# Patient Record
Sex: MALE | Race: WHITE | HISPANIC OR LATINO | Employment: OTHER | URBAN - METROPOLITAN AREA
[De-identification: names, ages, dates, MRNs, and addresses within clinical notes are randomized per-mention and may not be internally consistent; named-entity substitution may affect disease eponyms.]

---

## 2023-02-03 PROBLEM — M11.279: Status: ACTIVE | Noted: 2023-02-03

## 2023-02-03 PROBLEM — M77.01 MEDIAL EPICONDYLITIS OF RIGHT ELBOW: Status: ACTIVE | Noted: 2023-02-03

## 2023-02-03 PROBLEM — E16.2 HYPOGLYCEMIA: Status: ACTIVE | Noted: 2023-02-03

## 2023-02-03 PROBLEM — L30.1 ECZEMA, DYSHIDROTIC: Status: ACTIVE | Noted: 2023-02-03

## 2023-02-03 PROBLEM — R30.0 DYSURIA: Status: ACTIVE | Noted: 2023-02-03

## 2023-02-03 PROBLEM — I25.10 ATHEROSCLEROSIS OF NATIVE CORONARY ARTERY WITHOUT ANGINA PECTORIS: Status: ACTIVE | Noted: 2023-02-03

## 2023-02-03 PROBLEM — M94.0 COSTOCHONDRITIS, ACUTE: Status: ACTIVE | Noted: 2023-02-03

## 2023-02-03 PROBLEM — R35.0 URINARY FREQUENCY: Status: ACTIVE | Noted: 2023-02-03

## 2023-02-03 PROBLEM — M19.041 ARTHRITIS OF BOTH HANDS: Status: ACTIVE | Noted: 2023-02-03

## 2023-02-03 PROBLEM — T14.8XXA SIMPLE BRUISING: Status: ACTIVE | Noted: 2023-02-03

## 2023-02-03 PROBLEM — F41.1 ANXIETY, GENERALIZED: Status: ACTIVE | Noted: 2023-02-03

## 2023-02-03 PROBLEM — G45.4 TRANSIENT GLOBAL AMNESIA: Status: ACTIVE | Noted: 2023-02-03

## 2023-02-03 PROBLEM — J30.2 SEASONAL ALLERGIC RHINITIS: Status: ACTIVE | Noted: 2023-02-03

## 2023-02-03 PROBLEM — T78.40XA ALLERGY: Status: ACTIVE | Noted: 2023-02-03

## 2023-02-03 PROBLEM — K21.9 GERD (GASTROESOPHAGEAL REFLUX DISEASE): Status: ACTIVE | Noted: 2023-02-03

## 2023-02-03 PROBLEM — J30.9 ALLERGIC RHINITIS: Status: ACTIVE | Noted: 2023-02-03

## 2023-02-03 PROBLEM — J39.2 THROAT IRRITATION: Status: ACTIVE | Noted: 2023-02-03

## 2023-02-03 PROBLEM — M65.30 TRIGGER FINGER, ACQUIRED: Status: ACTIVE | Noted: 2023-02-03

## 2023-02-03 PROBLEM — M77.10 TENNIS ELBOW SYNDROME: Status: ACTIVE | Noted: 2023-02-03

## 2023-02-03 PROBLEM — R42 VERTIGO: Status: ACTIVE | Noted: 2023-02-03

## 2023-02-03 PROBLEM — M19.042 ARTHRITIS OF BOTH HANDS: Status: ACTIVE | Noted: 2023-02-03

## 2023-02-03 PROBLEM — R23.2 SKIN BLUSHING/FLUSHING: Status: ACTIVE | Noted: 2023-02-03

## 2023-02-03 PROBLEM — R07.89 CHEST PAIN, ATYPICAL: Status: ACTIVE | Noted: 2023-02-03

## 2023-02-03 PROBLEM — M26.69 TMJ DERANGEMENT: Status: ACTIVE | Noted: 2023-02-03

## 2023-02-03 PROBLEM — I10 HYPERTENSION: Status: ACTIVE | Noted: 2023-02-03

## 2023-02-03 PROBLEM — R00.2 PALPITATIONS: Status: ACTIVE | Noted: 2023-02-03

## 2023-02-03 PROBLEM — N36.8 URETHRAL IRRITATION: Status: ACTIVE | Noted: 2023-02-03

## 2023-02-03 RX ORDER — CYCLOBENZAPRINE HCL 10 MG
1 TABLET ORAL NIGHTLY PRN
COMMUNITY
Start: 2016-05-11 | End: 2023-03-20 | Stop reason: SDUPTHER

## 2023-02-03 RX ORDER — ALBUTEROL SULFATE 90 UG/1
2 AEROSOL, METERED RESPIRATORY (INHALATION) EVERY 6 HOURS PRN
COMMUNITY
Start: 2014-03-06

## 2023-02-03 RX ORDER — ATORVASTATIN CALCIUM 40 MG/1
1.5 TABLET, FILM COATED ORAL DAILY
COMMUNITY
Start: 2016-05-11

## 2023-02-03 RX ORDER — NEBIVOLOL 5 MG/1
3 TABLET ORAL DAILY
COMMUNITY
Start: 2013-07-02

## 2023-02-03 RX ORDER — MUPIROCIN 20 MG/G
OINTMENT TOPICAL SEE ADMIN INSTRUCTIONS
COMMUNITY
Start: 2021-10-12 | End: 2023-05-24 | Stop reason: ALTCHOICE

## 2023-02-03 RX ORDER — EZETIMIBE 10 MG/1
1 TABLET ORAL NIGHTLY
COMMUNITY
Start: 2014-03-04

## 2023-02-03 RX ORDER — CLOTRIMAZOLE AND BETAMETHASONE DIPROPIONATE 10; .64 MG/G; MG/G
CREAM TOPICAL SEE ADMIN INSTRUCTIONS
COMMUNITY
Start: 2021-10-25 | End: 2023-05-24 | Stop reason: ALTCHOICE

## 2023-03-14 LAB
ALANINE AMINOTRANSFERASE (SGPT) (U/L) IN SER/PLAS: 27 U/L (ref 10–52)
ALBUMIN (G/DL) IN SER/PLAS: 4 G/DL (ref 3.4–5)
ALKALINE PHOSPHATASE (U/L) IN SER/PLAS: 93 U/L (ref 33–136)
ANION GAP IN SER/PLAS: 8 MMOL/L (ref 10–20)
ASPARTATE AMINOTRANSFERASE (SGOT) (U/L) IN SER/PLAS: 21 U/L (ref 9–39)
BASOPHILS (10*3/UL) IN BLOOD BY AUTOMATED COUNT: 0.09 X10E9/L (ref 0–0.1)
BASOPHILS/100 LEUKOCYTES IN BLOOD BY AUTOMATED COUNT: 1.2 % (ref 0–2)
BILIRUBIN TOTAL (MG/DL) IN SER/PLAS: 1.1 MG/DL (ref 0–1.2)
CALCIUM (MG/DL) IN SER/PLAS: 9.4 MG/DL (ref 8.6–10.3)
CARBON DIOXIDE, TOTAL (MMOL/L) IN SER/PLAS: 32 MMOL/L (ref 21–32)
CHLORIDE (MMOL/L) IN SER/PLAS: 102 MMOL/L (ref 98–107)
CREATININE (MG/DL) IN SER/PLAS: 0.75 MG/DL (ref 0.5–1.3)
EOSINOPHILS (10*3/UL) IN BLOOD BY AUTOMATED COUNT: 0.24 X10E9/L (ref 0–0.4)
EOSINOPHILS/100 LEUKOCYTES IN BLOOD BY AUTOMATED COUNT: 3.1 % (ref 0–6)
ERYTHROCYTE DISTRIBUTION WIDTH (RATIO) BY AUTOMATED COUNT: 13.3 % (ref 11.5–14.5)
ERYTHROCYTE MEAN CORPUSCULAR HEMOGLOBIN CONCENTRATION (G/DL) BY AUTOMATED: 32.4 G/DL (ref 32–36)
ERYTHROCYTE MEAN CORPUSCULAR VOLUME (FL) BY AUTOMATED COUNT: 95 FL (ref 80–100)
ERYTHROCYTES (10*6/UL) IN BLOOD BY AUTOMATED COUNT: 5.09 X10E12/L (ref 4.5–5.9)
ESTIMATED AVERAGE GLUCOSE FOR HBA1C: 126 MG/DL
GFR MALE: >90 ML/MIN/1.73M2
GLUCOSE (MG/DL) IN SER/PLAS: 109 MG/DL (ref 74–99)
HEMATOCRIT (%) IN BLOOD BY AUTOMATED COUNT: 48.5 % (ref 41–52)
HEMOGLOBIN (G/DL) IN BLOOD: 15.7 G/DL (ref 13.5–17.5)
HEMOGLOBIN A1C/HEMOGLOBIN TOTAL IN BLOOD: 6 %
IMMATURE GRANULOCYTES/100 LEUKOCYTES IN BLOOD BY AUTOMATED COUNT: 0.4 % (ref 0–0.9)
LEUKOCYTES (10*3/UL) IN BLOOD BY AUTOMATED COUNT: 7.7 X10E9/L (ref 4.4–11.3)
LYMPHOCYTES (10*3/UL) IN BLOOD BY AUTOMATED COUNT: 1.28 X10E9/L (ref 0.8–3)
LYMPHOCYTES/100 LEUKOCYTES IN BLOOD BY AUTOMATED COUNT: 16.7 % (ref 13–44)
MONOCYTES (10*3/UL) IN BLOOD BY AUTOMATED COUNT: 0.62 X10E9/L (ref 0.05–0.8)
MONOCYTES/100 LEUKOCYTES IN BLOOD BY AUTOMATED COUNT: 8.1 % (ref 2–10)
NEUTROPHILS (10*3/UL) IN BLOOD BY AUTOMATED COUNT: 5.42 X10E9/L (ref 1.6–5.5)
NEUTROPHILS/100 LEUKOCYTES IN BLOOD BY AUTOMATED COUNT: 70.5 % (ref 40–80)
PLATELETS (10*3/UL) IN BLOOD AUTOMATED COUNT: 280 X10E9/L (ref 150–450)
POTASSIUM (MMOL/L) IN SER/PLAS: 4.8 MMOL/L (ref 3.5–5.3)
PROTEIN TOTAL: 6.7 G/DL (ref 6.4–8.2)
SODIUM (MMOL/L) IN SER/PLAS: 137 MMOL/L (ref 136–145)
THYROTROPIN (MIU/L) IN SER/PLAS BY DETECTION LIMIT <= 0.05 MIU/L: 0.99 MIU/L (ref 0.44–3.98)
UREA NITROGEN (MG/DL) IN SER/PLAS: 15 MG/DL (ref 6–23)

## 2023-03-17 LAB
PROSTATE SPECIFIC AG (NG/ML) IN SER/PLAS: 1.7 NG/ML (ref 0–4)
PROSTATE SPECIFIC AG FREE (NG/ML) IN SER/PLAS: 0.4 NG/ML
PROSTATE SPECIFIC AG FREE/PROSTATE SPECIFIC AG TOTAL IN SER/PLAS: 24 %

## 2023-03-20 ENCOUNTER — TELEPHONE (OUTPATIENT)
Dept: PRIMARY CARE | Facility: CLINIC | Age: 78
End: 2023-03-20

## 2023-03-20 ENCOUNTER — OFFICE VISIT (OUTPATIENT)
Dept: PRIMARY CARE | Facility: CLINIC | Age: 78
End: 2023-03-20
Payer: COMMERCIAL

## 2023-03-20 VITALS
SYSTOLIC BLOOD PRESSURE: 124 MMHG | BODY MASS INDEX: 28.16 KG/M2 | WEIGHT: 179.4 LBS | TEMPERATURE: 97.3 F | DIASTOLIC BLOOD PRESSURE: 80 MMHG | HEIGHT: 67 IN

## 2023-03-20 DIAGNOSIS — K21.9 GASTROESOPHAGEAL REFLUX DISEASE WITHOUT ESOPHAGITIS: ICD-10-CM

## 2023-03-20 DIAGNOSIS — M19.041 ARTHRITIS OF BOTH HANDS: ICD-10-CM

## 2023-03-20 DIAGNOSIS — I25.10 ATHEROSCLEROSIS OF NATIVE CORONARY ARTERY OF NATIVE HEART WITHOUT ANGINA PECTORIS: ICD-10-CM

## 2023-03-20 DIAGNOSIS — E16.2 HYPOGLYCEMIA: ICD-10-CM

## 2023-03-20 DIAGNOSIS — R35.0 URINARY FREQUENCY: ICD-10-CM

## 2023-03-20 DIAGNOSIS — F41.1 ANXIETY, GENERALIZED: ICD-10-CM

## 2023-03-20 DIAGNOSIS — E66.9 DIABETES MELLITUS TYPE 2 IN OBESE: Primary | ICD-10-CM

## 2023-03-20 DIAGNOSIS — E11.69 DIABETES MELLITUS TYPE 2 IN OBESE: Primary | ICD-10-CM

## 2023-03-20 DIAGNOSIS — M11.279: ICD-10-CM

## 2023-03-20 DIAGNOSIS — M19.042 ARTHRITIS OF BOTH HANDS: ICD-10-CM

## 2023-03-20 DIAGNOSIS — M62.830 MUSCLE SPASM OF BACK: ICD-10-CM

## 2023-03-20 PROCEDURE — 1036F TOBACCO NON-USER: CPT | Performed by: FAMILY MEDICINE

## 2023-03-20 PROCEDURE — 3074F SYST BP LT 130 MM HG: CPT | Performed by: FAMILY MEDICINE

## 2023-03-20 PROCEDURE — 99214 OFFICE O/P EST MOD 30 MIN: CPT | Performed by: FAMILY MEDICINE

## 2023-03-20 PROCEDURE — 1159F MED LIST DOCD IN RCRD: CPT | Performed by: FAMILY MEDICINE

## 2023-03-20 PROCEDURE — 3079F DIAST BP 80-89 MM HG: CPT | Performed by: FAMILY MEDICINE

## 2023-03-20 RX ORDER — CYCLOBENZAPRINE HCL 10 MG
10 TABLET ORAL NIGHTLY PRN
Qty: 20 TABLET | Refills: 1 | Status: SHIPPED | OUTPATIENT
Start: 2023-03-20 | End: 2024-03-14 | Stop reason: SDUPTHER

## 2023-03-20 RX ORDER — TAMSULOSIN HYDROCHLORIDE 0.4 MG/1
0.4 CAPSULE ORAL DAILY
Qty: 90 CAPSULE | Refills: 1 | Status: SHIPPED | OUTPATIENT
Start: 2023-03-20 | End: 2023-05-24 | Stop reason: ALTCHOICE

## 2023-03-20 ASSESSMENT — ENCOUNTER SYMPTOMS
CONSTITUTIONAL NEGATIVE: 1
CARDIOVASCULAR NEGATIVE: 1
PSYCHIATRIC NEGATIVE: 1
SHORTNESS OF BREATH: 0
HEMATOLOGIC/LYMPHATIC NEGATIVE: 1
COUGH: 0
NEUROLOGICAL NEGATIVE: 1
WHEEZING: 0
EYES NEGATIVE: 1

## 2023-03-20 NOTE — TELEPHONE ENCOUNTER
Pt states he was seen this morning (3/20/2023) and did not get the RX for the Flomax. Would like it sent to [facility] in [place].

## 2023-03-20 NOTE — PROGRESS NOTES
Subjective   Patient ID: Franklin Jason is a 77 y.o. male who presents for refills on medication and to discuss his health maintenance.  HPI  December 28, 2020  75-year-old male is here today for follow-up and to get a hemoglobin A1c. He does see Dr. Peralta a regular basis who is managing his cardiac medications. His last lipid panel was done through [facility] on August 12, 2022 and showed a total cholesterol of 130 HDL 49 a ratio of 2.65 and LDL of 68 and a triglyceride level of 63 . His hemoglobin A1c today was 6.1%. He was somewhat surprised with this number so we discussed ways of getting the number back down below 5.7%. He had an episode of joint inflammation that he thought might be due to gout however his uric acid level was not elevated at the time so the diagnosis was made of pseudogout and a prescription for Medrol Dosepak was phoned in. The needle worked well and I am giving her prescription to have on hand should he experience another attack. I will see him back in 3 months and at that time we will repeat his hemoglobin A1c comprehensive metabolic panel CBC TSH and PSA 04 January 26, 2022  This is 76-year-old male is here today because he has developed some pain primarily involving his left ankle associated with redness and swelling. He had been working in a cramped space really caulking an upstairs bathroom but does not remember any specific injury to his ankle or foot. That evening he noted more swelling and it was painful even to lie in bed and rollover. He has never had an episode of joint swelling like this in the past and there is no family history of gout that he is aware of.  He also has been shoveling snow and has also developed soreness on the inside of his right elbow consistent with medial epicondylitis.  I am having him get a uric acid level drawn and I am printing out information on treatment for gout. We will go on the relatively short steroid course with prednisone 10  mg #14. We discussed that sometimes gout can become recurrent and may require the use of medication preventatively to keep his uric acid levels from becoming too high. In the meantime I recommend that he increase his fluid intake so that he is not bordering on dehydration.     September 22, 2021  This 75-year-old male is here today for several reasons.  1. Groin Rash:  The patient has had a rash on his penis that partially involves the head of the penis but also been extends down underneath the corona. The rash is not particularly pruritic nor is it significantly erythematous at least on the head of the penis. He denies any specific irritation or exposure to new products. He says it does hurt a little more after intercourse but not dramatically so. He has been concerned that he may have some prostate or other issue related to his urinary tract.  The rash that I can see is nonspecific and does not appear to be bacterial or fungal.  2. Left Axillary Pain:  The patient has noted some soreness in his left armpit and was concerned he might have lymph gland swelling. His physical examination was normal and he does not have any signs of lymphadenopathy.  3. Lab Tests:  The patient has not had a PSA level done since 2017 since his brother was recently diagnosed with prostate cancer. We brought his old test to look at them and none of them were above 1.3.  I am sending him downstairs for a PSA along with other pending lab work she has not had for about 1 year.  4. The patient previously has had a lung nodule but has not had it checked for several years. I am writing up a lab slip for him to get a CT scan noncontrast of the chest to follow-up on his pulmonary nodules. He will be getting this test at [facility] or the [facility] as they have the rest of his images to compare.     August 15, 2020  This 74-year-old male is here today ostensibly to get a trigger finger injected but he has several other concerns and  "questions.  1-Dyshidrotic Eczema:  The patient has problems waking up and having difficulty getting back to sleep because of itching. The itching tends to be \"all over\". He does take Benadryl 2 teaspoons at bedtime to help him sleep and also help with the itching. I have recommended that he reduce the amount of soap and warm water he subjects himself to every day when he showers. I have also suggested he start using a skin care, and like Eucerin or Lac-Hydrin.  2-trigger finger:  The patient's middle finger on his right hand is triggering. He has had this happen in the past and he has responded to injections. He also has in the past undergone procedures for releasing trigger fingers through [facility].  3- Dry Mouth:  The patient is complaining of frequently having a \"dry mouth\". He has tried material and products like Biotene without success. I indicated there are prescription medications but they often have additional side effects.  4-pulmonary nodules:  The patient has had pulmonary nodules that were discovered in the course of the CT scan of his chest done at the [facility]. I am giving him a work order to get another noncontrast CT of the chest to follow-up on these pulmonary nodules     Review of Systems   Constitutional: Negative.    HENT: Negative.     Eyes: Negative.    Respiratory:  Negative for cough, shortness of breath and wheezing.    Cardiovascular: Negative.    Genitourinary: Negative.    Neurological: Negative.    Hematological: Negative.    Psychiatric/Behavioral: Negative.     All other systems reviewed and are negative.      Objective   Physical Exam  Vitals and nursing note reviewed.   Constitutional:       Appearance: Normal appearance. He is normal weight.   HENT:      Head: Normocephalic.      Right Ear: Ear canal normal.      Left Ear: Ear canal normal.      Nose: Nose normal.      Mouth/Throat:      Mouth: Mucous membranes are moist.   Eyes:      Pupils: Pupils are equal, round, " and reactive to light.   Neurological:      General: No focal deficit present.      Mental Status: He is alert and oriented to person, place, and time.   Psychiatric:         Mood and Affect: Mood normal.         Behavior: Behavior normal.         Thought Content: Thought content normal.         Judgment: Judgment normal.         Assessment/Plan

## 2023-05-24 ENCOUNTER — OFFICE VISIT (OUTPATIENT)
Dept: PRIMARY CARE | Facility: CLINIC | Age: 78
End: 2023-05-24
Payer: COMMERCIAL

## 2023-05-24 VITALS
WEIGHT: 180 LBS | SYSTOLIC BLOOD PRESSURE: 120 MMHG | DIASTOLIC BLOOD PRESSURE: 76 MMHG | BODY MASS INDEX: 28.25 KG/M2 | TEMPERATURE: 97.7 F | HEIGHT: 67 IN

## 2023-05-24 DIAGNOSIS — R35.0 FREQUENCY OF URINATION: Primary | ICD-10-CM

## 2023-05-24 PROCEDURE — 1159F MED LIST DOCD IN RCRD: CPT | Performed by: FAMILY MEDICINE

## 2023-05-24 PROCEDURE — 3074F SYST BP LT 130 MM HG: CPT | Performed by: FAMILY MEDICINE

## 2023-05-24 PROCEDURE — 3078F DIAST BP <80 MM HG: CPT | Performed by: FAMILY MEDICINE

## 2023-05-24 PROCEDURE — 99397 PER PM REEVAL EST PAT 65+ YR: CPT | Performed by: FAMILY MEDICINE

## 2023-05-24 PROCEDURE — 1036F TOBACCO NON-USER: CPT | Performed by: FAMILY MEDICINE

## 2023-05-24 RX ORDER — DOXAZOSIN 2 MG/1
2 TABLET ORAL NIGHTLY
Qty: 30 TABLET | Refills: 5 | Status: SHIPPED | OUTPATIENT
Start: 2023-05-24 | End: 2023-11-20

## 2023-05-24 ASSESSMENT — ENCOUNTER SYMPTOMS
WEAKNESS: 1
JOINT SWELLING: 1
NECK PAIN: 0
APPETITE CHANGE: 0
PALPITATIONS: 0
BRUISES/BLEEDS EASILY: 0
SPEECH DIFFICULTY: 0
DIARRHEA: 0
VOICE CHANGE: 0
HEADACHES: 0
DIAPHORESIS: 0
NERVOUS/ANXIOUS: 0
CONSTIPATION: 0
VOMITING: 0
PHOTOPHOBIA: 0
CHILLS: 0
ADENOPATHY: 0
EYE DISCHARGE: 0
ANAL BLEEDING: 0
SHORTNESS OF BREATH: 0
EYE REDNESS: 0
DIZZINESS: 0
SLEEP DISTURBANCE: 0
SORE THROAT: 0
FATIGUE: 0
ABDOMINAL PAIN: 0
WHEEZING: 0
CHEST TIGHTNESS: 0
SINUS PRESSURE: 0
CONFUSION: 0
BACK PAIN: 0
COLOR CHANGE: 1
EYE ITCHING: 0
BLOOD IN STOOL: 0
SEIZURES: 0
FREQUENCY: 0
ABDOMINAL DISTENTION: 0
NUMBNESS: 0
DECREASED CONCENTRATION: 0
NAUSEA: 0
ARTHRALGIAS: 0
AGITATION: 0
FEVER: 0
COUGH: 0
TREMORS: 0
EYE PAIN: 0
UNEXPECTED WEIGHT CHANGE: 0
TROUBLE SWALLOWING: 0
NECK STIFFNESS: 0
HEMATURIA: 0

## 2023-05-24 NOTE — PROGRESS NOTES
Subjective   Patient ID: Franklin Jason is a 77 y.o. male who presents for No chief complaint on file..  A depressed scaly red area on his elbow.  HPI         May 24, 2023        This 77-year-old male who is not on Medicare because he is covered through his wife's health plan, is here today complaining of a depressed red scaly area on his elbow.  He has a hardened scab at the tip of his olecranon process that he continues to irritate him if he leans against something.  He also has some golfers elbow symptoms.  The other reason he has come in is because of significant nasal congestion as a side effect from taking Flomax.  The Flomax was working well but the side effect of nasal congestion is tolerable.  I am going to change his medication to doxazosin 4 mg of him to try that instead.    December 28, 2020  75-year-old male is here today for follow-up and to get a hemoglobin A1c. He does see Dr. Peralta a regular basis who is managing his cardiac medications. His last lipid panel was done through [facility] on August 12, 2022 and showed a total cholesterol of 130 HDL 49 a ratio of 2.65 and LDL of 68 and a triglyceride level of 63 . His hemoglobin A1c today was 6.1%. He was somewhat surprised with this number so we discussed ways of getting the number back down below 5.7%. He had an episode of joint inflammation that he thought might be due to gout however his uric acid level was not elevated at the time so the diagnosis was made of pseudogout and a prescription for Medrol Dosepak was phoned in. The needle worked well and I am giving her prescription to have on hand should he experience another attack. I will see him back in 3 months and at that time we will repeat his hemoglobin A1c comprehensive metabolic panel CBC TSH and PSA 04 January 26, 2022  This is 76-year-old male is here today because he has developed some pain primarily involving his left ankle associated with redness and swelling. He had  been working in a cramped space really caulking an upstairs bathroom but does not remember any specific injury to his ankle or foot. That evening he noted more swelling and it was painful even to lie in bed and rollover. He has never had an episode of joint swelling like this in the past and there is no family history of gout that he is aware of.  He also has been shoveling snow and has also developed soreness on the inside of his right elbow consistent with medial epicondylitis.  I am having him get a uric acid level drawn and I am printing out information on treatment for gout. We will go on the relatively short steroid course with prednisone 10 mg #14. We discussed that sometimes gout can become recurrent and may require the use of medication preventatively to keep his uric acid levels from becoming too high. In the meantime I recommend that he increase his fluid intake so that he is not bordering on dehydration.     September 22, 2021  This 75-year-old male is here today for several reasons.  1. Groin Rash:  The patient has had a rash on his penis that partially involves the head of the penis but also been extends down underneath the corona. The rash is not particularly pruritic nor is it significantly erythematous at least on the head of the penis. He denies any specific irritation or exposure to new products. He says it does hurt a little more after intercourse but not dramatically so. He has been concerned that he may have some prostate or other issue related to his urinary tract.  The rash that I can see is nonspecific and does not appear to be bacterial or fungal.  2. Left Axillary Pain:  The patient has noted some soreness in his left armpit and was concerned he might have lymph gland swelling. His physical examination was normal and he does not have any signs of lymphadenopathy.  3. Lab Tests:  The patient has not had a PSA level done since 2017 since his brother was recently diagnosed with prostate  "cancer. We brought his old test to look at them and none of them were above 1.3.  I am sending him downstairs for a PSA along with other pending lab work she has not had for about 1 year.  4. The patient previously has had a lung nodule but has not had it checked for several years. I am writing up a lab slip for him to get a CT scan noncontrast of the chest to follow-up on his pulmonary nodules. He will be getting this test at [facility] or the [facility] as they have the rest of his images to compare.     August 15, 2020  This 74-year-old male is here today ostensibly to get a trigger finger injected but he has several other concerns and questions.  1-Dyshidrotic Eczema:  The patient has problems waking up and having difficulty getting back to sleep because of itching. The itching tends to be \"all over\". He does take Benadryl 2 teaspoons at bedtime to help him sleep and also help with the itching. I have recommended that he reduce the amount of soap and warm water he subjects himself to every day when he showers. I have also suggested he start using a skin care, and like Eucerin or Lac-Hydrin.  2-trigger finger:  The patient's middle finger on his right hand is triggering. He has had this happen in the past and he has responded to injections. He also has in the past undergone procedures for releasing trigger fingers through [facility].  3- Dry Mouth:  The patient is complaining of frequently having a \"dry mouth\". He has tried material and products like Biotene without success. I indicated there are prescription medications but they often have additional side effects.  4-pulmonary nodules:  The patient has had pulmonary nodules that were discovered in the course of the CT scan of his chest done at the [facility]. I am giving him a work order to get another noncontrast CT of the chest to follow-up on these pulmonary nodules     Review of Systems   Constitutional:  Negative for appetite change, " chills, diaphoresis, fatigue, fever and unexpected weight change.   HENT:  Negative for congestion, ear discharge, ear pain, hearing loss, nosebleeds, sinus pressure, sore throat, tinnitus, trouble swallowing and voice change.    Eyes:  Negative for photophobia, pain, discharge, redness, itching and visual disturbance.   Respiratory:  Negative for cough, chest tightness, shortness of breath and wheezing.    Cardiovascular:  Negative for chest pain, palpitations and leg swelling.   Gastrointestinal:  Negative for abdominal distention, abdominal pain, anal bleeding, blood in stool, constipation, diarrhea, nausea and vomiting.   Endocrine: Negative for polyuria.   Genitourinary:  Negative for frequency and hematuria.   Musculoskeletal:  Positive for joint swelling. Negative for arthralgias, back pain, neck pain and neck stiffness.   Skin:  Positive for color change and rash.   Allergic/Immunologic: Negative for environmental allergies and food allergies.   Neurological:  Positive for weakness. Negative for dizziness, tremors, seizures, syncope, speech difficulty, numbness and headaches.   Hematological:  Negative for adenopathy. Does not bruise/bleed easily.   Psychiatric/Behavioral:  Negative for agitation, behavioral problems, confusion, decreased concentration, sleep disturbance and suicidal ideas. The patient is not nervous/anxious.        Objective   Physical Exam  Vitals and nursing note reviewed.   HENT:      Head: Normocephalic.   Cardiovascular:      Rate and Rhythm: Normal rate.   Pulmonary:      Effort: Pulmonary effort is normal.   Musculoskeletal:         General: Swelling, tenderness and deformity present. Normal range of motion.      Cervical back: Normal range of motion.   Skin:     General: Skin is warm and dry.   Neurological:      General: No focal deficit present.      Mental Status: He is alert and oriented to person, place, and time.   Psychiatric:         Mood and Affect: Mood normal.          Behavior: Behavior normal.         Thought Content: Thought content normal.         Judgment: Judgment normal.       Assessment/Plan

## 2024-01-31 ENCOUNTER — TELEMEDICINE (OUTPATIENT)
Dept: PRIMARY CARE | Facility: CLINIC | Age: 79
End: 2024-01-31
Payer: COMMERCIAL

## 2024-01-31 DIAGNOSIS — U07.1 COVID-19: Primary | ICD-10-CM

## 2024-01-31 PROCEDURE — 99213 OFFICE O/P EST LOW 20 MIN: CPT

## 2024-01-31 ASSESSMENT — ENCOUNTER SYMPTOMS
SINUS PAIN: 1
NAUSEA: 0
SWOLLEN GLANDS: 0
WHEEZING: 1
HEADACHES: 1
ABDOMINAL PAIN: 0
RHINORRHEA: 1
COUGH: 1
SORE THROAT: 0

## 2024-01-31 NOTE — PATIENT INSTRUCTIONS
you test positive for COVID-19, stay home for at least 5 days and isolate from others in your home.    You are likely most infectious during these first 5 days.    Wear a high-quality mask if you must be around others at home and in public.  Do not go places where you are unable to wear a mask. For travel guidance, see [facility]’s Travel webpage.  Do not travel.  Stay home and separate from others as much as possible.  Use a separate bathroom, if possible.  Take steps to improve ventilation at home, if possible.  Don’t share personal household items, like cups, towels, and utensils.  Monitor your symptoms. If you have an emergency warning sign (like trouble breathing), seek emergency medical care immediately.

## 2024-01-31 NOTE — PROGRESS NOTES
Subjective   Patient ID: Franklin Jason is a 78 y.o. male who presents for covid.    URI   This is a new problem. Episode onset: tested covid positive this AM. The problem has been gradually improving. There has been no fever. Associated symptoms include congestion, coughing, headaches, rhinorrhea, sinus pain and wheezing. Pertinent negatives include no abdominal pain, chest pain, ear pain, nausea, sore throat or swollen glands. Treatments tried: albuterol uses for the asthma.        Review of Systems   HENT:  Positive for congestion, rhinorrhea and sinus pain. Negative for ear pain and sore throat.    Respiratory:  Positive for cough and wheezing.    Cardiovascular:  Negative for chest pain.   Gastrointestinal:  Negative for abdominal pain and nausea.   Neurological:  Positive for headaches.       Objective   There were no vitals taken for this visit.    Physical Exam pt seen via video feed to be in no acute distress educated to hold statin, ok to continue with flonase, and benadryl that he normally takes    Assessment/Plan   Problem List Items Addressed This Visit    None  Visit Diagnoses         Codes    COVID-19    -  Primary U07.1    Relevant Medications    nirmatrelvir-ritonavir (PAXLOVID) 300 mg (150 mg x 2)-100 mg tablet therapy pack

## 2024-03-14 ENCOUNTER — OFFICE VISIT (OUTPATIENT)
Dept: PRIMARY CARE | Facility: CLINIC | Age: 79
End: 2024-03-14
Payer: COMMERCIAL

## 2024-03-14 VITALS
OXYGEN SATURATION: 97 % | WEIGHT: 184 LBS | HEART RATE: 74 BPM | BODY MASS INDEX: 28.44 KG/M2 | SYSTOLIC BLOOD PRESSURE: 150 MMHG | TEMPERATURE: 97 F | DIASTOLIC BLOOD PRESSURE: 82 MMHG

## 2024-03-14 DIAGNOSIS — R10.31 BILATERAL LOWER ABDOMINAL PAIN: Primary | ICD-10-CM

## 2024-03-14 DIAGNOSIS — S03.00XD DISLOCATION OF TEMPOROMANDIBULAR JOINT, SUBSEQUENT ENCOUNTER: ICD-10-CM

## 2024-03-14 DIAGNOSIS — R10.32 BILATERAL LOWER ABDOMINAL PAIN: Primary | ICD-10-CM

## 2024-03-14 PROBLEM — R13.10 DYSPHAGIA: Status: ACTIVE | Noted: 2024-03-14

## 2024-03-14 PROBLEM — R06.83 SNORING: Status: ACTIVE | Noted: 2024-03-14

## 2024-03-14 PROBLEM — R19.8 ALTERED BOWEL FUNCTION: Status: RESOLVED | Noted: 2024-03-14 | Resolved: 2024-03-14

## 2024-03-14 PROBLEM — B07.9 VERRUCA: Status: ACTIVE | Noted: 2024-03-14

## 2024-03-14 PROBLEM — N40.0 BENIGN PROSTATIC HYPERPLASIA WITHOUT URINARY OBSTRUCTION: Status: ACTIVE | Noted: 2024-03-14

## 2024-03-14 PROBLEM — K11.8 SALIVARY DUCT OBSTRUCTION: Status: RESOLVED | Noted: 2024-03-14 | Resolved: 2024-03-14

## 2024-03-14 PROBLEM — J98.01 ACUTE BRONCHOSPASM DUE TO VIRAL INFECTION: Status: RESOLVED | Noted: 2024-03-14 | Resolved: 2024-03-14

## 2024-03-14 PROBLEM — R10.9 ABDOMINAL PAIN: Status: RESOLVED | Noted: 2024-03-14 | Resolved: 2024-03-14

## 2024-03-14 PROBLEM — K11.1 SALIVARY GLAND ENLARGEMENT: Status: RESOLVED | Noted: 2024-03-14 | Resolved: 2024-03-14

## 2024-03-14 PROBLEM — N43.3 HYDROCELE: Status: ACTIVE | Noted: 2024-03-14

## 2024-03-14 PROBLEM — M10.9 GOUT: Status: ACTIVE | Noted: 2024-03-14

## 2024-03-14 PROBLEM — R19.8 CHANGE IN BOWEL MOVEMENT: Status: ACTIVE | Noted: 2024-03-14

## 2024-03-14 PROBLEM — B07.0 PLANTAR WARTS: Status: ACTIVE | Noted: 2024-03-14

## 2024-03-14 PROBLEM — B34.9 ACUTE BRONCHOSPASM DUE TO VIRAL INFECTION: Status: RESOLVED | Noted: 2024-03-14 | Resolved: 2024-03-14

## 2024-03-14 PROBLEM — N48.9 PENILE LESION: Status: ACTIVE | Noted: 2024-03-14

## 2024-03-14 PROBLEM — M25.529 PAIN, ELBOW: Status: RESOLVED | Noted: 2024-03-14 | Resolved: 2024-03-14

## 2024-03-14 PROBLEM — R91.1 PULMONARY NODULE: Status: ACTIVE | Noted: 2024-03-14

## 2024-03-14 PROBLEM — N45.1 EPIDIDYMITIS: Status: ACTIVE | Noted: 2024-03-14

## 2024-03-14 PROBLEM — H92.02 OTALGIA, LEFT EAR: Status: RESOLVED | Noted: 2024-03-14 | Resolved: 2024-03-14

## 2024-03-14 PROBLEM — M19.049 ARTHRITIS OF HAND: Status: ACTIVE | Noted: 2024-03-14

## 2024-03-14 PROBLEM — G60.8 PERIPHERAL SENSORY NEUROPATHY: Status: ACTIVE | Noted: 2024-03-14

## 2024-03-14 PROBLEM — R10.30 GROIN PAIN: Status: ACTIVE | Noted: 2024-03-14

## 2024-03-14 LAB
POC APPEARANCE, URINE: CLEAR
POC BILIRUBIN, URINE: NEGATIVE
POC BLOOD, URINE: NEGATIVE
POC COLOR, URINE: ABNORMAL
POC GLUCOSE, URINE: NEGATIVE MG/DL
POC KETONES, URINE: NEGATIVE MG/DL
POC LEUKOCYTES, URINE: NEGATIVE
POC NITRITE,URINE: NEGATIVE
POC PH, URINE: 6 PH
POC PROTEIN, URINE: ABNORMAL MG/DL
POC SPECIFIC GRAVITY, URINE: 1.02
POC UROBILINOGEN, URINE: 0.2 EU/DL

## 2024-03-14 PROCEDURE — 3077F SYST BP >= 140 MM HG: CPT | Performed by: NURSE PRACTITIONER

## 2024-03-14 PROCEDURE — 1160F RVW MEDS BY RX/DR IN RCRD: CPT | Performed by: NURSE PRACTITIONER

## 2024-03-14 PROCEDURE — 99214 OFFICE O/P EST MOD 30 MIN: CPT | Performed by: NURSE PRACTITIONER

## 2024-03-14 PROCEDURE — 3079F DIAST BP 80-89 MM HG: CPT | Performed by: NURSE PRACTITIONER

## 2024-03-14 PROCEDURE — 1159F MED LIST DOCD IN RCRD: CPT | Performed by: NURSE PRACTITIONER

## 2024-03-14 PROCEDURE — 81003 URINALYSIS AUTO W/O SCOPE: CPT | Performed by: NURSE PRACTITIONER

## 2024-03-14 PROCEDURE — 1036F TOBACCO NON-USER: CPT | Performed by: NURSE PRACTITIONER

## 2024-03-14 RX ORDER — CYCLOBENZAPRINE HCL 10 MG
10 TABLET ORAL NIGHTLY PRN
Qty: 20 TABLET | Refills: 1 | Status: SHIPPED | OUTPATIENT
Start: 2024-03-14

## 2024-03-14 RX ORDER — AMOXICILLIN 500 MG/1
TABLET, FILM COATED ORAL
COMMUNITY
Start: 2020-03-13 | End: 2024-03-14 | Stop reason: WASHOUT

## 2024-03-14 ASSESSMENT — PATIENT HEALTH QUESTIONNAIRE - PHQ9
2. FEELING DOWN, DEPRESSED OR HOPELESS: NOT AT ALL
SUM OF ALL RESPONSES TO PHQ9 QUESTIONS 1 AND 2: 0
1. LITTLE INTEREST OR PLEASURE IN DOING THINGS: NOT AT ALL

## 2024-03-14 ASSESSMENT — ENCOUNTER SYMPTOMS
CONSTITUTIONAL NEGATIVE: 1
ROS GI COMMENTS: AS NOTED IN HPI
PSYCHIATRIC NEGATIVE: 1
RESPIRATORY NEGATIVE: 1
CARDIOVASCULAR NEGATIVE: 1
MUSCULOSKELETAL NEGATIVE: 1
NEUROLOGICAL NEGATIVE: 1

## 2024-03-14 NOTE — PROGRESS NOTES
Subjective   Patient ID: Franklin Jason is a 78 y.o. male who presents for Abdominal Pain (Lower x 4 days).    HPI Presents today with some lower abdominal pain.  Has had a history of urinary issues, recurrent UTI.  Has seen urology many years ago.  No burning or frequency but his urinary flow is down ongoing.   Discomfort more with change of position. Advil helps.   Has history chronic uti  Treated for pelvic floor weakness at that time and that took care of it.   Is requesting flexeril for TMJ uses when needed.  Has had on and off for years.  Seeing dentist for a mouth guard this week.  Review of Systems   Constitutional: Negative.    Respiratory: Negative.     Cardiovascular: Negative.    Gastrointestinal:         As noted in HPI     Genitourinary:         As noted in HPI     Musculoskeletal: Negative.    Neurological: Negative.    Psychiatric/Behavioral: Negative.         Objective   /82 (BP Location: Left arm, Patient Position: Sitting)   Pulse 74   Temp 36.1 °C (97 °F) (Temporal)   Wt 83.5 kg (184 lb)   SpO2 97%   BMI 28.44 kg/m²     Physical Exam  Constitutional:       Appearance: Normal appearance.   HENT:      Right Ear: Tympanic membrane, ear canal and external ear normal.      Left Ear: Tympanic membrane, ear canal and external ear normal.      Mouth/Throat:      Mouth: Mucous membranes are moist.      Pharynx: Oropharynx is clear.   Cardiovascular:      Rate and Rhythm: Normal rate and regular rhythm.   Pulmonary:      Effort: Pulmonary effort is normal.      Breath sounds: Normal breath sounds.   Abdominal:      General: Abdomen is flat. Bowel sounds are normal.      Palpations: Abdomen is soft.      Tenderness: There is no abdominal tenderness. There is no right CVA tenderness, left CVA tenderness, guarding or rebound.   Musculoskeletal:         General: Normal range of motion.   Neurological:      General: No focal deficit present.      Mental Status: He is alert.   Psychiatric:          Mood and Affect: Mood normal.         Behavior: Behavior normal.         Assessment/Plan   Problem List Items Addressed This Visit             ICD-10-CM    Muscle spasm of back M62.830    Relevant Medications    cyclobenzaprine (Flexeril) 10 mg tablet     Other Visit Diagnoses         Codes    Bilateral lower abdominal pain    -  Primary R10.31, R10.32

## 2024-03-21 ENCOUNTER — APPOINTMENT (OUTPATIENT)
Dept: PRIMARY CARE | Facility: CLINIC | Age: 79
End: 2024-03-21
Payer: COMMERCIAL

## 2024-03-28 ENCOUNTER — OFFICE VISIT (OUTPATIENT)
Dept: PRIMARY CARE | Facility: CLINIC | Age: 79
End: 2024-03-28
Payer: COMMERCIAL

## 2024-03-28 VITALS
SYSTOLIC BLOOD PRESSURE: 150 MMHG | BODY MASS INDEX: 28.25 KG/M2 | TEMPERATURE: 96.9 F | DIASTOLIC BLOOD PRESSURE: 80 MMHG | HEIGHT: 67 IN | WEIGHT: 180 LBS

## 2024-03-28 DIAGNOSIS — I48.92 ATRIAL FLUTTER BY ELECTROCARDIOGRAM (MULTI): Primary | ICD-10-CM

## 2024-03-28 DIAGNOSIS — M26.622 ARTHRALGIA OF LEFT TEMPOROMANDIBULAR JOINT: ICD-10-CM

## 2024-03-28 PROBLEM — M94.0 COSTOCHONDRITIS, ACUTE: Status: RESOLVED | Noted: 2023-02-03 | Resolved: 2024-03-28

## 2024-03-28 PROBLEM — M62.830 MUSCLE SPASM OF BACK: Status: RESOLVED | Noted: 2023-03-20 | Resolved: 2024-03-28

## 2024-03-28 PROBLEM — R19.8 CHANGE IN BOWEL MOVEMENT: Status: RESOLVED | Noted: 2024-03-14 | Resolved: 2024-03-28

## 2024-03-28 PROBLEM — M10.9 GOUT: Status: RESOLVED | Noted: 2024-03-14 | Resolved: 2024-03-28

## 2024-03-28 PROBLEM — R35.0 URINARY FREQUENCY: Status: RESOLVED | Noted: 2023-02-03 | Resolved: 2024-03-28

## 2024-03-28 PROBLEM — T14.8XXA SIMPLE BRUISING: Status: RESOLVED | Noted: 2023-02-03 | Resolved: 2024-03-28

## 2024-03-28 PROBLEM — B07.9 VERRUCA: Status: RESOLVED | Noted: 2024-03-14 | Resolved: 2024-03-28

## 2024-03-28 PROBLEM — R07.89 CHEST PAIN, ATYPICAL: Status: RESOLVED | Noted: 2023-02-03 | Resolved: 2024-03-28

## 2024-03-28 PROBLEM — T78.40XA ALLERGY: Status: RESOLVED | Noted: 2023-02-03 | Resolved: 2024-03-28

## 2024-03-28 PROBLEM — R00.2 PALPITATIONS: Status: RESOLVED | Noted: 2023-02-03 | Resolved: 2024-03-28

## 2024-03-28 PROBLEM — M77.10 TENNIS ELBOW SYNDROME: Status: RESOLVED | Noted: 2023-02-03 | Resolved: 2024-03-28

## 2024-03-28 PROBLEM — B07.0 PLANTAR WARTS: Status: RESOLVED | Noted: 2024-03-14 | Resolved: 2024-03-28

## 2024-03-28 PROBLEM — N36.8 URETHRAL IRRITATION: Status: RESOLVED | Noted: 2023-02-03 | Resolved: 2024-03-28

## 2024-03-28 PROBLEM — J39.2 THROAT IRRITATION: Status: RESOLVED | Noted: 2023-02-03 | Resolved: 2024-03-28

## 2024-03-28 PROBLEM — R30.0 DYSURIA: Status: RESOLVED | Noted: 2023-02-03 | Resolved: 2024-03-28

## 2024-03-28 PROBLEM — M65.30 TRIGGER FINGER, ACQUIRED: Status: RESOLVED | Noted: 2023-02-03 | Resolved: 2024-03-28

## 2024-03-28 PROBLEM — J30.2 SEASONAL ALLERGIC RHINITIS: Status: RESOLVED | Noted: 2023-02-03 | Resolved: 2024-03-28

## 2024-03-28 PROBLEM — E16.2 HYPOGLYCEMIA: Status: RESOLVED | Noted: 2023-02-03 | Resolved: 2024-03-28

## 2024-03-28 PROBLEM — R10.30 GROIN PAIN: Status: RESOLVED | Noted: 2024-03-14 | Resolved: 2024-03-28

## 2024-03-28 PROBLEM — J30.9 ALLERGIC RHINITIS: Status: RESOLVED | Noted: 2023-02-03 | Resolved: 2024-03-28

## 2024-03-28 PROBLEM — M19.049 ARTHRITIS OF HAND: Status: RESOLVED | Noted: 2024-03-14 | Resolved: 2024-03-28

## 2024-03-28 PROBLEM — R23.2 SKIN BLUSHING/FLUSHING: Status: RESOLVED | Noted: 2023-02-03 | Resolved: 2024-03-28

## 2024-03-28 PROBLEM — N48.9 PENILE LESION: Status: RESOLVED | Noted: 2024-03-14 | Resolved: 2024-03-28

## 2024-03-28 PROCEDURE — 93000 ELECTROCARDIOGRAM COMPLETE: CPT | Performed by: FAMILY MEDICINE

## 2024-03-28 PROCEDURE — 99215 OFFICE O/P EST HI 40 MIN: CPT | Performed by: FAMILY MEDICINE

## 2024-03-28 PROCEDURE — 1159F MED LIST DOCD IN RCRD: CPT | Performed by: FAMILY MEDICINE

## 2024-03-28 PROCEDURE — 3077F SYST BP >= 140 MM HG: CPT | Performed by: FAMILY MEDICINE

## 2024-03-28 PROCEDURE — 1160F RVW MEDS BY RX/DR IN RCRD: CPT | Performed by: FAMILY MEDICINE

## 2024-03-28 PROCEDURE — 3079F DIAST BP 80-89 MM HG: CPT | Performed by: FAMILY MEDICINE

## 2024-03-28 NOTE — PROGRESS NOTES
"Subjective   Patient ID: Franklin Jason is a 78 y.o. male who presents for Annual Exam (BRIT from Dr. Ruiz, ALEJANDRA).    Patient presenting for annual wellness exam.  He was recently given Flexeril for TMJ.  Wife states that the last time he had an experience with TMJAnd pain in his jaw and ended up with a stent placement.  He has been having this pain again.  He does follow with cardiology  He was seen in August 2023.  His history is with a stent to his left circumflex in 1998 he does have chronic palpitations and wore an event monitor that showed lots of PVCs and PACs.  He continues on Bystolic.  He is not currently having any chest pain or shortness of breath.  He does not feel his heart fluttering.    Otherwise labs last done about a year ago his A1c was 6.0% his CMP showed an elevated fasting glucose of 109 otherwise within normal limits his PSA was 1.7 which is stable CBC within normal limits TSH within normal limits.  Lipid panel is within normal limits.      Objective   /80   Temp 36.1 °C (96.9 °F)   Ht 1.702 m (5' 7\")   Wt 81.6 kg (180 lb)   BMI 28.19 kg/m²     Physical Exam  Constitutional:       General: He is not in acute distress.     Appearance: Normal appearance. He is not ill-appearing, toxic-appearing or diaphoretic.   HENT:      Head: Normocephalic and atraumatic.   Eyes:      Extraocular Movements: Extraocular movements intact.      Pupils: Pupils are equal, round, and reactive to light.   Cardiovascular:      Rate and Rhythm: Normal rate and regular rhythm.      Pulses: Normal pulses.      Heart sounds: Normal heart sounds.   Pulmonary:      Effort: Pulmonary effort is normal.      Breath sounds: Normal breath sounds.   Neurological:      Mental Status: He is alert.         Assessment/Plan   Diagnoses and all orders for this visit:  Atrial flutter by electrocardiogram (CMS/[facility])  Arthralgia of left temporomandibular joint  -     ECG 12 lead (Clinic Performed)    Franklin is a 78-year-old " male presenting for his annual wellness exam however when he arrived he mentioned that he was having pain in his jaw that felt like TMJ and notes that it happens mostly after dinner but last night did happen during sleep we did an in office EKG today and it did show atrial flutter when compared with the EKG we have on file from 2022 this is a new finding.  I do realize that he follows with cardiologist through the [facility] and so if he had any Left EKGs after 2022 I was not able to access them to review so from my position it does appear that this is a new finding.  Even after the review of Dr. Wolfe's notes he does have a history of frequent PACs that were found on a event monitor.    The finding of atrial flutter combined with his personal cardiac history and his jaw pain I had a conversation with the patient and recommended that he go to the hospital.  He agreed to go to [place] as I was where his cardiologist is.  The patient wanted to drive by private vehicle as a part opposed to going by squad I did discuss the risks of cardiovascular events whilst driving and that the most safe option would be to go via squad.  He declined.  I called [place] ED and discussed the case with a PA in the ED named Rafael.  Will be happy to follow him for hospital follow-up once discharged.    Nina Ospina DO     I spent a total of 25 minutes on the date of the service which included preparing to see the patient, face-to-face patient care, completing clinical documentation, performing a medically appropriate examination, counseling and educating the patient/family/caregiver and ordering medications, tests, or procedures.

## 2025-03-06 ENCOUNTER — APPOINTMENT (OUTPATIENT)
Dept: ALLERGY | Facility: CLINIC | Age: 80
End: 2025-03-06
Payer: COMMERCIAL

## 2025-03-06 VITALS
SYSTOLIC BLOOD PRESSURE: 202 MMHG | DIASTOLIC BLOOD PRESSURE: 89 MMHG | WEIGHT: 174.4 LBS | BODY MASS INDEX: 27.31 KG/M2 | HEART RATE: 67 BPM

## 2025-03-06 DIAGNOSIS — L28.2 PRURITIC RASH: ICD-10-CM

## 2025-03-06 DIAGNOSIS — J30.89 ALLERGIC RHINITIS DUE TO OTHER ALLERGIC TRIGGER, UNSPECIFIED SEASONALITY: ICD-10-CM

## 2025-03-06 DIAGNOSIS — H10.45 CHRONIC ALLERGIC CONJUNCTIVITIS: ICD-10-CM

## 2025-03-06 DIAGNOSIS — J30.1 ALLERGIC RHINITIS DUE TO POLLEN, UNSPECIFIED SEASONALITY: Primary | ICD-10-CM

## 2025-03-06 PROCEDURE — 95004 PERQ TESTS W/ALRGNC XTRCS: CPT | Performed by: ALLERGY & IMMUNOLOGY

## 2025-03-06 PROCEDURE — 99204 OFFICE O/P NEW MOD 45 MIN: CPT | Performed by: ALLERGY & IMMUNOLOGY

## 2025-03-06 RX ORDER — AZELASTINE 1 MG/ML
2 SPRAY, METERED NASAL 2 TIMES DAILY
Qty: 30 ML | Refills: 11 | Status: SHIPPED | OUTPATIENT
Start: 2025-03-06 | End: 2026-03-06

## 2025-03-06 NOTE — PROGRESS NOTES
Subjective   Patient ID:   [id number]   Franklin Jason is a 79 y.o. male who presents for Allergy Testing (Diagnosed with asthma at 4 yrs. Old.  Saw  last fall and is here for a second opinion.  Started with allergy shots 2-3 months ago for tree pollen, ragweed and dust mites.  Also concerned about food allergies.).    Chief Complaint   Patient presents with    Allergy Testing     Diagnosed with asthma at 4 yrs. Old.  Saw  last fall and is here for a second opinion.  Started with allergy shots 2-3 months ago for tree pollen, ragweed and dust mites.  Also concerned about food allergies.          HPI  This patient is here to evaluate for:  Allergic rhinitis and conjunctivitis and concerns for food allergy    Since age 3yo, he has had allergies and was positive for a large number of allergies on skin testing including to wheat/grain.   He does not recall the entire list of allergens.    +mouth breathing, constant post nasal drainage, blowing nose all day long. He reports these have been present since a child. He recalls discussing it with Dr. Ruiz many years ago.  They wake him up at night.    Medications that have improved the symptoms: Benadryl for the past 75 years; he prefers the liquid.  He takes it as needed, about 3-4 times per week.   He uses flonase x 5 years ago.   Ragweed   His ophthalmologist dx glaucoma (?) and was concerned about the flonase usage; he stopped flonase and     Using saline nasal spray but not rinses. Can help significantly but othertimes not helping.     This past Fall, allergy symptoms including sneezing, itchy eyes, waking up blowing his nose, bothering his wife's sleep, so he met with Dr. Alejandra. Started on allergy shots.     History of skin itching and seen by Dermatologist.  Concern that it is related to medication allergy. Possibly worse on eliquis.  Was recommended to use a prescribed cream, unknown name.    Developed an allergic reaction to the medication. And  needed oral steroids for it to resolve.   I recommended he find out the name and add that medicine to the medication allergy list    Pmhx: cad s/p stent.  And recent dx of atrial fibrillation so on eliquis.      Review of Systems   All other systems reviewed and are negative.      Objective     BP (!) 202/89 (BP Location: Left arm, Patient Position: Sitting)   Pulse 67   Wt 79.1 kg (174 lb 6.4 oz)   BMI 27.31 kg/m²      Physical Exam  Constitutional:       General: He is not in acute distress.     Appearance: Normal appearance. He is not ill-appearing.   HENT:      Head: Normocephalic and atraumatic.      Right Ear: Tympanic membrane, ear canal and external ear normal.      Left Ear: Tympanic membrane, ear canal and external ear normal.      Nose: Nose normal. No congestion or rhinorrhea.      Mouth/Throat:      Mouth: Mucous membranes are moist.      Pharynx: Oropharynx is clear. No oropharyngeal exudate or posterior oropharyngeal erythema.   Eyes:      General:         Right eye: No discharge.         Left eye: No discharge.      Conjunctiva/sclera: Conjunctivae normal.   Cardiovascular:      Rate and Rhythm: Normal rate and regular rhythm.      Heart sounds: Normal heart sounds. No murmur heard.     No friction rub. No gallop.   Pulmonary:      Effort: Pulmonary effort is normal. No respiratory distress.      Breath sounds: Normal breath sounds. No stridor. No wheezing, rhonchi or rales.   Chest:      Chest wall: No tenderness.   Abdominal:      General: Abdomen is flat.      Palpations: Abdomen is soft.   Musculoskeletal:         General: Normal range of motion.      Cervical back: Normal range of motion and neck supple.   Lymphadenopathy:      Cervical: No cervical adenopathy.   Skin:     General: Skin is warm and dry.      Findings: No erythema, lesion or rash.   Neurological:      General: No focal deficit present.      Mental Status: He is alert. Mental status is at baseline.   Psychiatric:         Mood  and Affect: Mood normal.         Behavior: Behavior normal.         Thought Content: Thought content normal.         Judgment: Judgment normal.            Current Outpatient Medications   Medication Sig Dispense Refill    albuterol 90 mcg/actuation inhaler Inhale 2 puffs every 6 hours if needed.      ASPIRIN ORAL Take 1 tablet by mouth in the morning.      atorvastatin (Lipitor) 40 mg tablet Take 1.5 tablets (60 mg) by mouth once daily.      cyclobenzaprine (Flexeril) 10 mg tablet Take 1 tablet (10 mg) by mouth as needed at bedtime for muscle spasms. 20 tablet 1    doxazosin (Cardura) 2 mg tablet Take 1 tablet (2 mg) by mouth once daily at bedtime. (Patient not taking: Reported on 3/28/2024) 30 tablet 5    ezetimibe (Zetia) 10 mg tablet Take 1 tablet (10 mg) by mouth once daily at bedtime.      nebivolol (Bystolic) 5 mg tablet Take 3 tablets (15 mg) by mouth once daily.       No current facility-administered medications for this visit.       Summary of the labs over the past 6 months:    No visits with results within 6 Month(s) from this visit.   Latest known visit with results is:   Office Visit on 03/14/2024   Component Date Value Ref Range Status    POC Color, Urine 03/14/2024 Nohemi (A)  Straw, Yellow, Light-Yellow Final    POC Appearance, Urine 03/14/2024 Clear  Clear Final    POC Glucose, Urine 03/14/2024 NEGATIVE  NEGATIVE mg/dl Final    POC Bilirubin, Urine 03/14/2024 NEGATIVE  NEGATIVE Final    POC Ketones, Urine 03/14/2024 NEGATIVE  NEGATIVE mg/dl Final    POC Specific Gravity, Urine 03/14/2024 1.025  1.005 - 1.035 Final    POC Blood, Urine 03/14/2024 NEGATIVE  NEGATIVE Final    POC PH, Urine 03/14/2024 6.0  No Reference Range Established PH Final    POC Protein, Urine 03/14/2024 TRACE (A)  NEGATIVE, 30 (1+) mg/dl Final    POC Urobilinogen, Urine 03/14/2024 0.2  0.2, 1.0 EU/DL Final    Poc Nitrite, Urine 03/14/2024 NEGATIVE  NEGATIVE Final    POC Leukocytes, Urine 03/14/2024 NEGATIVE  NEGATIVE Final      FOOD SCRATCH SKIN TESTING:  No allergies to EGG, COW'S MILK, SOY, WHEAT, CODFISH, SHRIMP, WALNUT, AND PEANUT.     Scratch skin tests were positive to: tree pollen.    Assessment/Plan   Diagnoses and all orders for this visit:  Allergic rhinitis due to pollen, unspecified seasonality  -     azelastine (Astelin) 137 mcg (0.1 %) nasal spray; Administer 2 sprays into each nostril 2 times a day. Use in each nostril as directed  Allergic rhinitis due to other allergic trigger, unspecified seasonality  -     azelastine (Astelin) 137 mcg (0.1 %) nasal spray; Administer 2 sprays into each nostril 2 times a day. Use in each nostril as directed  Chronic allergic conjunctivitis  -     azelastine (Astelin) 137 mcg (0.1 %) nasal spray; Administer 2 sprays into each nostril 2 times a day. Use in each nostril as directed  Pruritic rash    It was a pleasure to meet you and we are happy to welcome you to our office. We would be happy to see you at either of our  office locations in [facility] or [place].    We discussed starting nasal saline rinses. You want to use distilled water to the salt packets. Make sure not to blow your nose out hard; also do not cover either nostril when blowing. Many people do this in the shower, then take your shower, and afterwards you may use your nasal sprays.     We discussed it takes additional time for the allergy shots to work.  Follow-up with Dr. Alejandra for continuation of the allergy shots.    Food allergy - the screen was negative today but I advised you to keep a food diary and discuss with Dr. Alejandra or me if further skin/blood testing is needed. We discussed the differences between food allergy and sensitivity.      Xavier Villatoro MD